# Patient Record
Sex: MALE | Race: WHITE | NOT HISPANIC OR LATINO | URBAN - METROPOLITAN AREA
[De-identification: names, ages, dates, MRNs, and addresses within clinical notes are randomized per-mention and may not be internally consistent; named-entity substitution may affect disease eponyms.]

---

## 2018-01-17 NOTE — MISCELLANEOUS
Message  sw pt he is no longer under our care he is being treated by [facility]  Active Problems    1  Acute nonsuppurative otitis media (381 00) (H65 199)   2  Acute pharyngitis (462) (J02 9)   3  Allergic rhinitis (477 9) (J30 9)   4  Bursitis of hip, unspecified laterality (726 5) (M70 70)   5  Carotid artery stenosis (433 10) (I65 29)   6  Cervicalgia (723 1) (M54 2)   7  Cigarette smoker (305 1) (F17 210)   8  Controlled diabetes mellitus (250 00) (E11 9)   9  COPD (chronic obstructive pulmonary disease) with acute bronchitis (491 22) (J44 0)   10  Cough (786 2) (R05)   11  Depression with anxiety (300 4) (F41 8)   12  Flatus (787 3) (R14 3)   13  GERD without esophagitis (530 81) (K21 9)   14  Herniated nucleus pulposus, L3-4 (722 10) (M51 26)   15  Herniation of lumbar intervertebral disc (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 10) (M51 26)   16  Hyperlipidemia (272 4) (E78 5)   17  Hypertension (401 9) (I10)   18  Insomnia (780 52) (G47 00)   19  Low back pain (724 2) (M54 5)   20  Lumbago (724 2) (M54 5)   21  Lumbar disc displacement without myelopathy (722 10) (M51 26)   22  Organic impotence (607 84) (N52 9)   23  Other chronic pain (338 29) (G89 29)   24  Other muscle spasm (728 85) (M62 838)   25  Peripheral arterial disease (443 9) (I73 9)   26  Sacroiliitis (720 2) (M46 1)   27  Shortness of breath (786 05) (R06 02)   28  Special screening examination for neoplasm of prostate (V76 44) (Z12 5)   29  Spermatocele (608 1) (N43 40)   30  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 3) (M47 816)   31  Symptoms referable to back (724 8) (R29 898)   32  Testicular discomfort (608 9) (N50 8)   33  Thrombophlebitis of superficial veins of lower extremity, unspecified laterality   34  Type 2 diabetes mellitus (250 00) (E11 9)   35  Vaccine refused by patient (V64 09) (Z28 20)   36  Vitamin B12 deficiency (266 2) (E53 8)   37  Vitamin D deficiency (268 9) (E55 9)    Current Meds   1   Accu-Tamiko Arteaga In Vitro Strip; USE 1 KIT Twice daily; Therapy: 57AJX6564 to (Evaluate:83Xli4307)  Requested for: 64YSW1289; Last   Rx:22Oct2015 Ordered   2  Accu-Chek FastClix Lancets Miscellaneous; test tid; Therapy: 25GVM0787 to (Last Rx:22Oct2015)  Requested for: 33YJV8913 Ordered   3  Aspirin 81 MG TABS; TAKE 1 TABLET DAILY; Therapy: 70LNU4579 to (Adolph Andrew)  Requested for: 59HFH9995; Last   Rx:14Ltr2848 Ordered   4  Aspirin Low Dose 81 MG Oral Tablet Chewable; CHEW AND SWALLOW ONE TABLET   BY MOUTH ONCE A DAY; Therapy: 70MCC0432 to (Evaluate:35Lln6167)  Requested for: 16ZHC9210; Last   Rx:63Kic0702 Ordered   5  Atrovent HFA 17 MCG/ACT Inhalation Aerosol Solution; INHALE 2 PUFFS 4 TIMES   DAILY AS NEEDED; Therapy: 94Baz0015 to (Last Rx:35Zaz5198) Ordered   6  BD Insulin Syr Ultrafine II 31G X 5/16" 0 5 ML Miscellaneous; USE AS DIRECTED; Therapy: 42BNR8263 to (Last GE:60AYB3926)  Requested for: 36KKO5456 Ordered   7  BD Pen Needle Short U/F 31G X 8 MM Miscellaneous; USE AS DIRECTED; Therapy: 65Rcs5596 to (Last Rx:85Otq3190)  Requested for: 21Zhs5434 Ordered   8  Gabapentin 600 MG Oral Tablet; TAKE ONE TABLET BY MOUTH THREE TIMES DAILY; Therapy: 87DPN5885 to (Evaluate:58Lpq8281)  Requested for: 33ITO5635; Last   Rx:22Oct2015 Ordered   9  Ibuprofen 600 MG Oral Tablet; take one tablet by mouth twice daily; Therapy: 18FYJ2079 to (Evaluate:85Tns0989)  Requested for: 01EYZ6481; Last   Rx:22Oct2015 Ordered   10  Lantus 100 UNIT/ML Subcutaneous Solution; inject 8 units as directed; Therapy: 16SGL1022 to (Evaluate:18Faf3812)  Requested for: 18EFA1547; Last    Rx:22Oct2015 Ordered   11  Lisinopril 20 MG Oral Tablet; TAKE ONE TABLET BY MOUTH EVERY DAY AS    DIRECTED; Therapy: 99QQZ9746 to (Evaluate:25Ors7182)  Requested for: 73BJP1344; Last    Rx:22Oct2015 Ordered   12  Metoprolol Succinate ER 50 MG Oral Tablet Extended Release 24 Hour; TAKE ONE    TABLET BY MOUTH ONCE A DAY;     Therapy: 44OAB7308 to (Evaluate:27Sqc0561) Requested for: 05CFK9743; Last    Rx:22Oct2015 Ordered   13  Proventil  (90 Base) MCG/ACT Inhalation Aerosol Solution; Two inhalations 1    minute apart every 4 or more hours as needed; Therapy: 81Hrd8152 to (Last Rx:22Oct2015) Ordered   14  Ranitidine HCl - 150 MG Oral Tablet (Zantac); take one tablet by mouth twice daily; Therapy: 17YUR6832 to (Rick Mayorga)  Requested for: 31TLQ2366; Last    Rx:26Oct2015; Status: ACTIVE - Renewal Denied Ordered   15  Simvastatin 10 MG Oral Tablet; TAKE ONE TABLET BY MOUTH ONCE A DAY; Therapy: 13OUV8768 to (Evaluate:45Yvf3491)  Requested for: 98XXU9907; Last    Rx:22Oct2015 Ordered   16  Tradjenta 5 MG Oral Tablet; TAKE ONE TABLET BY MOUTH ONCE A DAY; Therapy: 38LTW4765 to (Evaluate:10Kmi5307); Last Rx:22Oct2015 Ordered   17  TraMADol HCl - 50 MG Oral Tablet; TAKE 1 TO 2 TABLETS 3 TIMES DAILY AS NEEDED; Therapy: 16FUB4297 to (Evaluate:01Jol0021)  Requested for: 27NFR7415; Last    Rx:22Oct2015; Status: ACTIVE - Renewal Denied Ordered   18  TraZODone HCl - 300 MG Oral Tablet; TAKE 1 TABLETS AT BEDTIME; Therapy: 69RCJ4564 [facility])  Requested for: 71DCF0697 Ordered   19  UltiCare Insulin Syringe 31G X 5/16" 0 5 ML Miscellaneous; Therapy: 08Kfk9834 to (Evaluate:05Nov2014) Recorded   20  Viagra 100 MG Oral Tablet; TAKE 1 TABLET DAILY 1 HOUR BEFORE NEEDED; Therapy: 26SRN6918 to (Evaluate:19Nov2015); Last Rx:22Oct2015 Ordered   21  Vitamin B-12 250 MCG Oral Tablet; TAKE 1 TABLET DAILY AS DIRECTED; Therapy: 94QIL8836 to [phone])  Requested for: 20CPU4810; Last    Rx:22Oct2015 Ordered   22  Vitamin D 2000 UNIT Oral Tablet; TAKE ONE TABLET BY MOUTH ONCE A DAY; Therapy: 67Mvm1988 to (Evaluate:29Nov2015); Last Rx:82Hsz9642 Ordered   23  Vitamin D3 2000 UNIT Oral Tablet; Take 1 tablet daily; Therapy: 70DBU0976 to (Last Rx:70Dxt9688)  Requested for: 13CKM4048 Ordered    Allergies    1   MetFORMIN HCl TABS    Signatures Electronically signed by : Rachel Hernandez, ; Sep  8 2016  1:44PM EST                       (Author)

## 2021-11-01 ENCOUNTER — TELEPHONE (OUTPATIENT)
Dept: GASTROENTEROLOGY | Facility: CLINIC | Age: 66
End: 2021-11-01